# Patient Record
Sex: MALE | Race: ASIAN | NOT HISPANIC OR LATINO | Employment: FULL TIME | URBAN - METROPOLITAN AREA
[De-identification: names, ages, dates, MRNs, and addresses within clinical notes are randomized per-mention and may not be internally consistent; named-entity substitution may affect disease eponyms.]

---

## 2023-12-26 ENCOUNTER — OFFICE VISIT (OUTPATIENT)
Dept: FAMILY MEDICINE | Facility: CLINIC | Age: 28
End: 2023-12-26
Payer: COMMERCIAL

## 2023-12-26 VITALS
RESPIRATION RATE: 16 BRPM | HEIGHT: 69 IN | HEART RATE: 67 BPM | BODY MASS INDEX: 21.24 KG/M2 | TEMPERATURE: 98.2 F | SYSTOLIC BLOOD PRESSURE: 112 MMHG | WEIGHT: 143.4 LBS | DIASTOLIC BLOOD PRESSURE: 82 MMHG | OXYGEN SATURATION: 100 %

## 2023-12-26 DIAGNOSIS — L85.3 XEROSIS CUTIS: ICD-10-CM

## 2023-12-26 DIAGNOSIS — Z00.00 ROUTINE GENERAL MEDICAL EXAMINATION AT A HEALTH CARE FACILITY: ICD-10-CM

## 2023-12-26 DIAGNOSIS — R19.7 DIARRHEA, UNSPECIFIED TYPE: ICD-10-CM

## 2023-12-26 PROCEDURE — 99213 OFFICE O/P EST LOW 20 MIN: CPT | Mod: 25 | Performed by: INTERNAL MEDICINE

## 2023-12-26 PROCEDURE — 99385 PREV VISIT NEW AGE 18-39: CPT | Performed by: INTERNAL MEDICINE

## 2023-12-26 ASSESSMENT — ENCOUNTER SYMPTOMS
MYALGIAS: 0
NERVOUS/ANXIOUS: 0
HEMATURIA: 0
FREQUENCY: 0
DIZZINESS: 0
JOINT SWELLING: 0
PALPITATIONS: 0
CONSTIPATION: 0
DYSURIA: 0
HEMATOCHEZIA: 0
EYE PAIN: 0
SHORTNESS OF BREATH: 0
NAUSEA: 0
CHILLS: 0
FEVER: 0
SORE THROAT: 0
COUGH: 0
DIARRHEA: 0
HEARTBURN: 0
ABDOMINAL PAIN: 0
PARESTHESIAS: 0
HEADACHES: 0
WEAKNESS: 0
ARTHRALGIAS: 0

## 2023-12-26 NOTE — PATIENT INSTRUCTIONS
- I recommend getting the hepatitis B vaccine (your third one) before you travel in February.  This can be done at any time.    - I recommend keeping a detailed food journal for 2 months to see if you can identify a trigger for your diarrhea.  If you are losing weight, have blood or mucus in your stool, or the diarrhea is becoming more frequent please let me know because I would want to see you again and order more tests.    Preventive Health Recommendations  Male Ages 26 - 39    Yearly exam:             See your health care provider every year in order to  o   Review health changes.   o   Discuss preventive care.    o   Review your medicines if your doctor has prescribed any.  You should be tested each year for STDs (sexually transmitted diseases), if you re at risk.   After age 35, talk to your provider about cholesterol testing. If you are at risk for heart disease, have your cholesterol tested at least every 5 years.   If you are at risk for diabetes, you should have a diabetes test (fasting glucose).  Shots: Get a flu shot each year. Get a tetanus shot every 10 years.     Nutrition:  Eat at least 5 servings of fruits and vegetables daily.   Eat whole-grain bread, whole-wheat pasta and brown rice instead of white grains and rice.   Get adequate Calcium and Vitamin D.     Lifestyle  Exercise for at least 150 minutes a week (30 minutes a day, 5 days a week). This will help you control your weight and prevent disease.   Limit alcohol to one drink per day.   No smoking.   Wear sunscreen to prevent skin cancer.   See your dentist every six months for an exam and cleaning.

## 2023-12-26 NOTE — PROGRESS NOTES
SUBJECTIVE:   Yogesh is a 28 year old, presenting for the following:  Physical and Establish Care        12/26/2023     1:08 PM   Additional Questions   Roomed by OLINDA RODRIGUEZ       Healthy Habits:     Getting at least 3 servings of Calcium per day:  Yes    Bi-annual eye exam:  Yes    Dental care twice a year:  NO    Sleep apnea or symptoms of sleep apnea:  None    Diet:  Regular (no restrictions)    Frequency of exercise:  4-5 days/week    Duration of exercise:  30-45 minutes    Taking medications regularly:  Yes    Medication side effects:  None    Additional concerns today:  Yes      Today's PHQ-2 Score:       12/26/2023     1:04 PM   PHQ-2 ( 1999 Pf[employer])   Q1: Little interest or pleasure in doing things 0   Q2: Feeling down, depressed or hopeless 0   PHQ-2 Score 0   Q1: Little interest or pleasure in doing things Not at all   Q2: Feeling down, depressed or hopeless Not at all   PHQ-2 Score 0     Has very dry skin along jawline on both sides of his face.    Every two weeks has diarrhea- thinks it's mostly due to spice in food.  Usually, it's just one episode of diarrhea but sometimes it's two episodes.  Sometimes it's very urgent- like if he's out on a walk he needs to find a bathroom quickly.  Has been happening a couple years.  Last year, went for check up at [facility] and the NP told him wasn't frequent enough for evaluation.  No blood, no mucus.  No dairy intolerance as far as he knows.    Just had labs checked as part of [facility] for life insurance policy- seemed like everything was normal.  A1C, LFT's, creatinine normal.  TG mildly elevated at 238.  LDL 97 and HDL 55.      Have you ever done Advance Care Planning? (For example, a Health Directive, POLST, or a discussion with a medical provider or your loved ones about your wishes): No, advance care planning information given to patient to review.  Patient plans to discuss their wishes with loved ones or provider.      Social History     Tobacco Use    Smoking  "status: Never     Passive exposure: Past    Smokeless tobacco: Never   Substance Use Topics    Alcohol use: Not Currently     Comment: very rare, less than once per month             12/26/2023     1:04 PM   Alcohol Use   Prescreen: >3 drinks/day or >7 drinks/week? No       Last PSA: No results found for: \"PSA\"    Reviewed orders with patient. Reviewed health maintenance and updated orders accordingly - Yes  Recent labs reviewed    Reviewed and updated as needed this visit by clinical staff   Tobacco  Allergies  Meds  Problems  Med Hx  Surg Hx  Fam Hx          Reviewed and updated as needed this visit by Provider   Tobacco   Meds  Problems  Med Hx  Surg Hx  Fam Hx             Review of Systems   Constitutional:  Negative for chills and fever.   HENT:  Negative for congestion, ear pain, hearing loss and sore throat.    Eyes:  Negative for pain and visual disturbance.   Respiratory:  Negative for cough and shortness of breath.    Cardiovascular:  Negative for chest pain, palpitations and peripheral edema.   Gastrointestinal:  Negative for abdominal pain, constipation, diarrhea, heartburn, hematochezia and nausea.   Genitourinary:  Negative for dysuria, frequency, genital sores, hematuria, impotence, penile discharge and urgency.   Musculoskeletal:  Negative for arthralgias, joint swelling and myalgias.   Skin:  Negative for rash.   Neurological:  Negative for dizziness, weakness, headaches and paresthesias.   Psychiatric/Behavioral:  Negative for mood changes. The patient is not nervous/anxious.          OBJECTIVE:   /82 (BP Location: Right arm, Patient Position: Sitting, Cuff Size: Adult Regular)   Pulse 67   Temp 98.2  F (36.8  C) (Temporal)   Resp 16   Ht 1.765 m (5' 9.49\")   Wt 65 kg (143 lb 6.4 oz)   SpO2 100%   BMI 20.88 kg/m      Physical Exam  GENERAL: healthy, alert and no distress  EYES: Eyes grossly normal to inspection, PERRL and conjunctivae and sclerae normal  HENT: ear canals " and TM's normal, nose and mouth without ulcers or lesions  NECK: no adenopathy, no asymmetry, masses, or scars and thyroid normal to palpation  RESP: lungs clear to auscultation - no rales, rhonchi or wheezes  CV: regular rate and rhythm, normal S1 S2, no S3 or S4, no murmur, click or rub, no peripheral edema and peripheral pulses strong  ABDOMEN: soft, nontender, no hepatosplenomegaly, no masses and bowel sounds normal  MS: no gross musculoskeletal defects noted, no edema  SKIN: dry skin bilateral angle of mandible, texture definitively different from surrounding skin, no papules or erythema.  NEURO: Normal strength and tone, mentation intact and speech normal  PSYCH: mentation appears normal, affect normal/bright    Diagnostic Test Results:  Labs reviewed in Epic    ASSESSMENT/PLAN:   Yogesh was seen today for physical and establish care.    Diagnoses and all orders for this visit:    Routine general medical examination at a health care facility  Immunizations: Just got COVID, flu 12/20/23, HBV #3 scheduled.  Labs: Lipids normal last check, Diabetes screen normal last check  Discussed healthy lifestyle and aging recommendations including regular exercise, adequate and regular sleep, 5+ fruits and veggies daily.    Dry skin bilateral mandibles  Comments:  Unclear cause- maybe Jessner benign lymphocytic infiltrate?  Orders:  -     Adult Dermatology  Referral; Future    Diarrhea, unspecified type  Comments:  Intermittent, every 2 weeks- suspect food trigger.  Will keep food journal to see if he can identify cause.    Other orders  -     REVIEW OF HEALTH MAINTENANCE PROTOCOL ORDERS  -     PRIMARY CARE FOLLOW-UP SCHEDULING; Future        Patient has been advised of split billing requirements and indicates understanding: Yes      COUNSELING:   Reviewed preventive health counseling, as reflected in patient instructions        He reports that he has never smoked. He has been exposed to tobacco smoke. He has never  used smokeless tobacco.          Lali Ruiz DO  [facility]

## 2024-01-12 ENCOUNTER — ALLIED HEALTH/NURSE VISIT (OUTPATIENT)
Dept: FAMILY MEDICINE | Facility: CLINIC | Age: 29
End: 2024-01-12
Payer: COMMERCIAL

## 2024-01-12 DIAGNOSIS — Z23 ENCOUNTER FOR IMMUNIZATION: Primary | ICD-10-CM

## 2024-01-12 PROCEDURE — 99207 PR NO CHARGE NURSE ONLY: CPT

## 2024-01-12 PROCEDURE — 90636 HEP A/HEP B VACC ADULT IM: CPT

## 2024-01-12 PROCEDURE — 90471 IMMUNIZATION ADMIN: CPT

## 2024-01-12 NOTE — PROGRESS NOTES
Prior to immunization administration, verified patients identity using patient s name and date of birth. Please see Immunization Activity for additional information.     Screening Questionnaire for Adult Immunization    Are you sick today?   No   Do you have allergies to medications, food, a vaccine component or latex?   No   Have you ever had a serious reaction after receiving a vaccination?   No   Do you have a long-term health problem with heart, lung, kidney, or metabolic disease (e.g., diabetes), asthma, a blood disorder, no spleen, complement component deficiency, a cochlear implant, or a spinal fluid leak?  Are you on long-term aspirin therapy?   No   Do you have cancer, leukemia, HIV/AIDS, or any other immune system problem?   No   Do you have a parent, brother, or sister with an immune system problem?   No   In the past 3 months, have you taken medications that affect  your immune system, such as prednisone, other steroids, or anticancer drugs; drugs for the treatment of rheumatoid arthritis, Crohn s disease, or psoriasis; or have you had radiation treatments?   No   Have you had a seizure, or a brain or other nervous system problem?   No   During the past year, have you received a transfusion of blood or blood    products, or been given immune (gamma) globulin or antiviral drug?   No   For women: Are you pregnant or is there a chance you could become       pregnant during the next month?   No   Have you received any vaccinations in the past 4 weeks?   No     Immunization questionnaire answers were all negative.    I have reviewed the following standing orders:     This patient is due and qualifies for the Hepatitis A & B vaccine.    Click here for HEP A & B STANDING ORDER      I have reviewed the vaccines inclusion and exclusion criteria; No concerns regarding eligibility.     Huddled with Dr. Thakkar who ordered Twinrix for patient to receive today.    Patient instructed to remain in clinic for 15 minutes  afterwards, and to report any adverse reactions.     Screening performed by Ny Rojas RN on 1/12/2024 at 11:33 AM.

## 2024-11-26 ENCOUNTER — OFFICE VISIT (OUTPATIENT)
Dept: FAMILY MEDICINE | Facility: CLINIC | Age: 29
End: 2024-11-26
Payer: COMMERCIAL

## 2024-11-26 VITALS
RESPIRATION RATE: 15 BRPM | TEMPERATURE: 97.7 F | SYSTOLIC BLOOD PRESSURE: 104 MMHG | HEIGHT: 71 IN | BODY MASS INDEX: 20.55 KG/M2 | WEIGHT: 146.8 LBS | HEART RATE: 61 BPM | DIASTOLIC BLOOD PRESSURE: 70 MMHG | OXYGEN SATURATION: 98 %

## 2024-11-26 DIAGNOSIS — Z00.00 ROUTINE GENERAL MEDICAL EXAMINATION AT A HEALTH CARE FACILITY: Primary | ICD-10-CM

## 2024-11-26 DIAGNOSIS — R21 RASH AND OTHER NONSPECIFIC SKIN ERUPTION: ICD-10-CM

## 2024-11-26 PROCEDURE — 99213 OFFICE O/P EST LOW 20 MIN: CPT | Mod: 25 | Performed by: FAMILY MEDICINE

## 2024-11-26 PROCEDURE — 99395 PREV VISIT EST AGE 18-39: CPT | Performed by: FAMILY MEDICINE

## 2024-11-26 SDOH — HEALTH STABILITY: PHYSICAL HEALTH: ON AVERAGE, HOW MANY DAYS PER WEEK DO YOU ENGAGE IN MODERATE TO STRENUOUS EXERCISE (LIKE A BRISK WALK)?: 5 DAYS

## 2024-11-26 SDOH — HEALTH STABILITY: PHYSICAL HEALTH: ON AVERAGE, HOW MANY MINUTES DO YOU ENGAGE IN EXERCISE AT THIS LEVEL?: 60 MIN

## 2024-11-26 ASSESSMENT — SOCIAL DETERMINANTS OF HEALTH (SDOH): HOW OFTEN DO YOU GET TOGETHER WITH FRIENDS OR RELATIVES?: TWICE A WEEK

## 2024-11-26 ASSESSMENT — PAIN SCALES - GENERAL: PAINLEVEL_OUTOF10: NO PAIN (0)

## 2024-11-26 NOTE — PROGRESS NOTES
Preventive Care Visit  [facility]  Jorge Luis Rincon , Family Medicine  Nov 26, 2024      Assessment & Plan     Routine general medical examination at a health care facility  -Offered STI screening, HIV and hep C screening. Pt declined.    Rash and other nonspecific skin eruption  -Rash on b/l jaw line for years. Gets dry and irritated. Had derm referral placed last year but never followed through. Would like another referral to derm.  - Adult Dermatology  Referral              Counseling  Appropriate preventive services were addressed with this patient via screening, questionnaire, or discussion as appropriate for fall prevention, nutrition, physical activity, Tobacco-use cessation, social engagement, weight loss and cognition.  Checklist reviewing preventive services available has been given to the patient.  Reviewed patient's diet, addressing concerns and/or questions.   The patient was instructed to see the dentist every 6 months.           Hiram Zuñiga is a 29 year old, presenting for the following:  Physical (Both sides of jawline he has skin that gets very dry and irritated )        11/26/2024     9:04 AM   Additional Questions   Roomed by Kimmy LEAHY   Accompanied by self         11/26/2024     9:04 AM   Patient Reported Additional Medications   Patient reports taking the following new medications no          HPI    Health Care Directive  Patient does not have a Health Care Directive: Discussed advance care planning with patient; information given to patient to review.      11/26/2024   General Health   How would you rate your overall physical health? Excellent   Feel stress (tense, anxious, or unable to sleep) Only a little      (!) STRESS CONCERN      11/26/2024   Nutrition   Three or more servings of calcium each day? Yes   Diet: Regular (no restrictions)   How many servings of fruit and vegetables per day? (!) 2-3   How many sweetened beverages each day? (!) 2             11/26/2024   Exercise   Days per week of moderate/strenous exercise 5 days   Average minutes spent exercising at this level 60 min            11/26/2024   Social Factors   Frequency of gathering with friends or relatives Twice a week   Worry food won't last until get money to buy more No   Food not last or not have enough money for food? No   Do you have housing? (Housing is defined as stable permanent housing and does not include staying ouside in a car, in a tent, in an abandoned building, in an overnight shelter, or couch-surfing.) Yes   Are you worried about losing your housing? No   Lack of transportation? No   Unable to get utilities (heat,electricity)? No            11/26/2024   Dental   Dentist two times every year? (!) NO            11/26/2024   TB Screening   Were you born outside of the [place]? Yes            Today's PHQ-2 Score:       11/26/2024     8:51 AM   PHQ-2 ( 1999 Pfizer)   Q1: Little interest or pleasure in doing things 0    Q2: Feeling down, depressed or hopeless 0    PHQ-2 Score 0    Q1: Little interest or pleasure in doing things Not at all   Q2: Feeling down, depressed or hopeless Not at all   PHQ-2 Score 0       Patient-reported           11/26/2024   Substance Use   Alcohol more than 3/day or more than 7/wk No   Do you use any other substances recreationally? No        Social History     Tobacco Use    Smoking status: Never     Passive exposure: Past    Smokeless tobacco: Never   Vaping Use    Vaping status: Never Used   Substance Use Topics    Alcohol use: Not Currently     Comment: very rare, less than once per month    Drug use: Never             11/26/2024   One time HIV Screening   Previous HIV test? No          11/26/2024   STI Screening   New sexual partner(s) since last STI/HIV test? No            11/26/2024   Contraception/Family Planning   Questions about contraception or family planning No           Reviewed and updated as needed this visit by Provider   Tobacco   Meds     "Surg Hx  Fam Hx  Soc Hx Sexual Activity                   Objective    Exam  /70 (BP Location: Right arm, Patient Position: Sitting, Cuff Size: Adult Regular)   Pulse 61   Temp 97.7  F (36.5  C) (Temporal)   Resp 15   Ht 1.8 m (5' 10.87\")   Wt 66.6 kg (146 lb 12.8 oz)   SpO2 98%   BMI 20.55 kg/m     Estimated body mass index is 20.55 kg/m  as calculated from the following:    Height as of this encounter: 1.8 m (5' 10.87\").    Weight as of this encounter: 66.6 kg (146 lb 12.8 oz).    Physical Exam  GENERAL: alert and no distress  EYES: Eyes grossly normal to inspection, PERRL and conjunctivae and sclerae normal  HENT: nose and mouth without ulcers or lesions  NECK: no adenopathy, no asymmetry, masses, or scars  RESP: lungs clear to auscultation - no rales, rhonchi or wheezes  CV: regular rate and rhythm, normal S1 S2, no S3 or S4, no murmur, click or rub, no peripheral edema  ABDOMEN: soft, nontender, no hepatosplenomegaly, no masses and bowel sounds normal  MS: no gross musculoskeletal defects noted, no edema  SKIN: no suspicious lesions or rashes  NEURO: Normal strength and tone, mentation intact and speech normal  PSYCH: mentation appears normal, affect normal/bright        Signed Electronically by: Jorge Luis Rincon DO    "

## 2024-11-26 NOTE — PATIENT INSTRUCTIONS
Patient Education   Preventive Care Advice   This is general advice given by our system to help you stay healthy. However, your care team may have specific advice just for you. Please talk to your care team about your preventive care needs.  Nutrition  Eat 5 or more servings of fruits and vegetables each day.  Try wheat bread, brown rice and whole grain pasta (instead of white bread, rice, and pasta).  Get enough calcium and vitamin D. Check the label on foods and aim for 100% of the RDA (recommended daily allowance).  Lifestyle  Exercise at least 150 minutes each week  (30 minutes a day, 5 days a week).  Do muscle strengthening activities 2 days a week. These help control your weight and prevent disease.  No smoking.  Wear sunscreen to prevent skin cancer.  Have a dental exam and cleaning every 6 months.  Yearly exams  See your health care team every year to talk about:  Any changes in your health.  Any medicines your care team has prescribed.  Preventive care, family planning, and ways to prevent chronic diseases.  Shots (vaccines)   HPV shots (up to age 26), if you've never had them before.  Hepatitis B shots (up to age 59), if you've never had them before.  COVID-19 shot: Get this shot when it's due.  Flu shot: Get a flu shot every year.  Tetanus shot: Get a tetanus shot every 10 years.  Pneumococcal, hepatitis A, and RSV shots: Ask your care team if you need these based on your risk.  Shingles shot (for age 50 and up)  General health tests  Diabetes screening:  Starting at age 35, Get screened for diabetes at least every 3 years.  If you are younger than age 35, ask your care team if you should be screened for diabetes.  Cholesterol test: At age 39, start having a cholesterol test every 5 years, or more often if advised.  Bone density scan (DEXA): At age 50, ask your care team if you should have this scan for osteoporosis (brittle bones).  Hepatitis C: Get tested at least once in your life.  STIs (sexually  transmitted infections)  Before age 24: Ask your care team if you should be screened for STIs.  After age 24: Get screened for STIs if you're at risk. You are at risk for STIs (including HIV) if:  You are sexually active with more than one person.  You don't use condoms every time.  You or a partner was diagnosed with a sexually transmitted infection.  If you are at risk for HIV, ask about PrEP medicine to prevent HIV.  Get tested for HIV at least once in your life, whether you are at risk for HIV or not.  Cancer screening tests  Cervical cancer screening: If you have a cervix, begin getting regular cervical cancer screening tests starting at age 21.  Breast cancer scan (mammogram): If you've ever had breasts, begin having regular mammograms starting at age 40. This is a scan to check for breast cancer.  Colon cancer screening: It is important to start screening for colon cancer at age 45.  Have a colonoscopy test every 10 years (or more often if you're at risk) Or, ask your provider about stool tests like a FIT test every year or Cologuard test every 3 years.  To learn more about your testing options, visit:   .  For help making a decision, visit:   https://bit.ly/ao44969.  Prostate cancer screening test: If you have a prostate, ask your care team if a prostate cancer screening test (PSA) at age 55 is right for you.  Lung cancer screening: If you are a current or former smoker ages 50 to 80, ask your care team if ongoing lung cancer screenings are right for you.  For informational purposes only. Not to replace the advice of your health care provider. Copyright   2023 [employer]. All rights reserved. Clinically reviewed by the [facility] Transitions Program. Frequent Browser [id number] - REV 01/24.

## 2025-03-31 ENCOUNTER — OFFICE VISIT (OUTPATIENT)
Dept: DERMATOLOGY | Facility: CLINIC | Age: 30
End: 2025-03-31
Payer: COMMERCIAL

## 2025-03-31 DIAGNOSIS — L85.3 XEROSIS OF SKIN: Primary | ICD-10-CM

## 2025-03-31 PROCEDURE — 99203 OFFICE O/P NEW LOW 30 MIN: CPT | Performed by: STUDENT IN AN ORGANIZED HEALTH CARE EDUCATION/TRAINING PROGRAM

## 2025-03-31 NOTE — PROGRESS NOTES
[facility] Dermatology Note  Encounter Date: Mar 31, 2025  Office Visit     Reviewed patients past medical history and pertinent chart review prior to patients visit today.     Dermatology Problem List:  ____________________________________________    Assessment & Plan:     # Xerosis, left jaw  -No rashes present today, rash is intermittent and asymptomatic when present. DDX includes xerosis based on exam today vs seborrheic dermatitis. Patient to be diligent with over-the-counter moisturizers. He could consider trial of his head and shoulders shampoo prn to the jawline area for possibility of seborrheic dermatitis.  -Moisturizer is key and is usually the best way to prevent or treat dry skin. Large selections of moisturizing products are available including Vanicream, Neutrogena, Lubriderm, Cetaphil, CeraVe, and Aquaphor. Samples provided today. The best moisturizer is the one you are willing to use and should be applied at least twice daily and always after bathing.  -When bathing, use gentle soap such as Dove for Sensitive Skin and lukewarm water on amrpits, groin, and other visibly dirty areas, all other areas let the water run over but no soap is necessary. Pat skin dry instead of vigorous rubbing. Encouraged regular use of an emollient such as Vanicream, Cera Ve Cream or Cetaphil Cream to the entire body. Start a humidifier in bedroom when sleeping if possible.    Follow up: prn for new or changing lesions or new concerns     All risks, benefits and alternatives were discussed with patient.  Patient is in agreement and understands the assessment and plan.  All questions were answered.  Loreto Maldonado PA-C  [facility]  _______________________________________    CC: Derm Problem (Rash on jawline for 1 year. Rash is red and inflamed. )     HPI:  Mr. Yogesh Connolly is a(n) 29 year old male who presents today as a new patient for evaluation of a rash on his left jawline region.   This started approximately 1 year ago after he experienced a sunburn on vacation.  He notes the area will become dry and flaky. The area is asymptomatic and does not itch when present. It comes and goes. He uses an otc lotion, matheus and only applies this after showering. The area sometimes turns slightly more red when flared. Today is a good day for his rash. He does not get this rash elsewhere.  He uses head and shoulders shampoo on his scalp. He uses water to wash his face.    Patient is otherwise feeling well, without additional skin concerns.    Physical Exam:  SKIN: Focused examination of face and scalp was performed.  - no rashes or erythema  - left jawline, very subtle flaky scale    - No other lesions of concern on areas examined.     Medications:  Current Outpatient Medications   Medication Sig Dispense Refill    melatonin 3 MG tablet Take 3 mg by mouth nightly as needed for sleep.       No current facility-administered medications for this visit.      Past Medical History:   Patient Active Problem List   Diagnosis    Dry skin bilateral mandibles     No past medical history on file.    CC Jorge Luis Rincon,   [address] [facility] [address] [facility]  [place] [address] on close of this encounter.

## 2025-03-31 NOTE — LETTER
3/31/2025      Yogesh Connolly  [address]  [facility] [address]      Dear Colleague,    Thank you for referring your patient, Yogesh Connolly, to the [facility]. Please see a copy of my visit note below.    [facility] Dermatology Note  Encounter Date: Mar 31, 2025  Office Visit     Reviewed patients past medical history and pertinent chart review prior to patients visit today.     Dermatology Problem List:  ____________________________________________    Assessment & Plan:     # Xerosis, left jaw  -No rashes present today, rash is intermittent and asymptomatic when present. DDX includes xerosis based on exam today vs seborrheic dermatitis. Patient to be diligent with over-the-counter moisturizers. He could consider trial of his head and shoulders shampoo prn to the jawline area for possibility of seborrheic dermatitis.  -Moisturizer is key and is usually the best way to prevent or treat dry skin. Large selections of moisturizing products are available including Vanicream, Neutrogena, Lubriderm, Cetaphil, CeraVe, and Aquaphor. Samples provided today. The best moisturizer is the one you are willing to use and should be applied at least twice daily and always after bathing.  -When bathing, use gentle soap such as Dove for Sensitive Skin and lukewarm water on amrpits, groin, and other visibly dirty areas, all other areas let the water run over but no soap is necessary. Pat skin dry instead of vigorous rubbing. Encouraged regular use of an emollient such as Vanicream, Cera Ve Cream or Cetaphil Cream to the entire body. Start a humidifier in bedroom when sleeping if possible.    Follow up: prn for new or changing lesions or new concerns     All risks, benefits and alternatives were discussed with patient.  Patient is in agreement and understands the assessment and plan.  All questions were answered.  Loreto Maldonado PA-C  [facility]  Dermatology  _______________________________________    CC: Derm Problem (Rash on jawline for 1 year. Rash is red and inflamed. )     HPI:  Mr. Yogesh Connolly is a(n) 29 year old male who presents today as a new patient for evaluation of a rash on his left jawline region.  This started approximately 1 year ago after he experienced a sunburn on vacation.  He notes the area will become dry and flaky. The area is asymptomatic and does not itch when present. It comes and goes. He uses an otc lotion, matheus and only applies this after showering. The area sometimes turns slightly more red when flared. Today is a good day for his rash. He does not get this rash elsewhere.  He uses head and shoulders shampoo on his scalp. He uses water to wash his face.    Patient is otherwise feeling well, without additional skin concerns.    Physical Exam:  SKIN: Focused examination of face and scalp was performed.  - no rashes or erythema  - left jawline, very subtle flaky scale    - No other lesions of concern on areas examined.     Medications:  Current Outpatient Medications   Medication Sig Dispense Refill     melatonin 3 MG tablet Take 3 mg by mouth nightly as needed for sleep.       No current facility-administered medications for this visit.      Past Medical History:   Patient Active Problem List   Diagnosis     Dry skin bilateral mandibles     No past medical history on file.    CC Jorge Luis Rincon DO  [address] [place] [address] on close of this encounter.       Again, thank you for allowing me to participate in the care of your patient.        Sincerely,        Irlanda Maldonado PA-C    Electronically signed

## 2025-03-31 NOTE — PATIENT INSTRUCTIONS
Gentle Skin Care    Below is a list of products our providers recommend for gentle skin care.    Moisturizers:  Lighter: Exederm Intensive Moisture Cream, Cetaphil Cream, CeraVe, Aveeno Positively Radiant and Vanicream Light   Thicker: Aquaphor Ointment, Vaseline, Petroleum Jelly, Eucerin Original Healing Cream, Vanicream Cream, CeraVe Healing Ointment, Aquaphor Body Spray  Avoid Lotions (too thin)  Mild Cleansers:  Dove Fragrance-free Bar or Wash  CeraVe   Vanicream Cleansing Bar  Cetaphil Cleanser   Aquaphor 2-in-1 Gentle Wash and Shampoo  Dove Baby Wash  Exederm Body Wash       Laundry Products:  All Free and Clear Products  Cheer Free  Generic brands are okay as long as they are fragrance-free  Avoid fabric softeners and dryer sheets   Sunscreens: SPF 30 or greater   Sunscreens that contain zinc oxide and/or titanium dioxide should be applied. These are physical blockers. One or both of these should be listed in the active Ingredients.  Any other listed ingredients under the active ingredients would be a chemically-based sunscreen, which might be irritating.  Spray sunscreens should be avoided because these are typically chemical sunscreens.      Shampoo and Conditioners:  Free and Clear by Vanicream  Aquaphor 2-in-1 Gentle Wash and Shampoo   Oils:  Mineral Oil   Emu Oil   For some patients: coconut (raw, unrefined, organic) and sunflower seed oil      Keep in mind:  Generic products are an okay substitute, but make sure they are fragrance-free.  Reading the product ingredients list is very important.  Avoid product that have fragrance added to them.   Organic does not mean fragrance-free. In fact, patients with sensitive skin can become quite irritated by some organic products.     Recommendations:  Daily bathing. Make sure you are applying a good moisturizer after bathing every time.  Apply moisturizing creams at least twice daily to the whole body. Your provider may recommend a lighter or heavier  moisturizer based on the severity of your skin condition and that time of year it is.  Creams are more moisturizing than lotions.     Care Plan:  Keep bathing and showering short, less than 15 minutes.  Always use lukewarm warm when possible. AVOID hot or cold water.  DO NOT use bubble bath.  Limit the use of soaps. Focus on skin folds, face, armpits, groin, and feet towards the end of the bath.  Do NOT vigorously scrub when you cleanse the skin.  After bathing, PAT your skin lightly with a towel. DO NOT rub or scrub when drying.  ALWAYS apply a moisturizer immediately after bathing. This helps to lock in moisture. *IF YOU WERE PRESCRIBED A TOPICAL MEDICATION, APPLY YOUR MEDICATION FIRST, AND THEN COVER WITH YOUR DAILY MOISTURIZER.*  Reapply moisturizing agents to your whole body at least twice daily.    Other helpful tips:  Do not use products such as powders, perfumes, or colognes on your skin.  Diffusers can be harsh on sensitive skin. Use with caution if you have sensitive skin.  Avoid saunas and steam baths. This temperature is too hot.  Avoid tight or scratchy clothing, such as wool.  Always wash new clothing before wearing them for the first time.  Sometimes a humidifier or vaporizer can be used at night can help the dry skin. Remember to keep these items clean to avoid mold growth.    Who should I call with questions?  [facility]: [phone]  [facility]: [phone]  For urgent needs outside of business hours call the [facility] at [phone] and ask for the dermatology resident on call.